# Patient Record
Sex: MALE | Race: WHITE | NOT HISPANIC OR LATINO | Employment: FULL TIME | URBAN - METROPOLITAN AREA
[De-identification: names, ages, dates, MRNs, and addresses within clinical notes are randomized per-mention and may not be internally consistent; named-entity substitution may affect disease eponyms.]

---

## 2023-02-22 ENCOUNTER — OFFICE VISIT (OUTPATIENT)
Dept: ORTHOPEDIC SURGERY | Facility: CLINIC | Age: 68
End: 2023-02-22
Payer: COMMERCIAL

## 2023-02-22 VITALS — TEMPERATURE: 97.3 F | WEIGHT: 169.6 LBS | HEIGHT: 68 IN | BODY MASS INDEX: 25.7 KG/M2

## 2023-02-22 DIAGNOSIS — M17.10 PATELLOFEMORAL ARTHRITIS: Primary | ICD-10-CM

## 2023-02-22 DIAGNOSIS — M72.2 PLANTAR FASCIITIS OF RIGHT FOOT: ICD-10-CM

## 2023-02-22 PROCEDURE — 73562 X-RAY EXAM OF KNEE 3: CPT | Performed by: ORTHOPAEDIC SURGERY

## 2023-02-22 PROCEDURE — 99204 OFFICE O/P NEW MOD 45 MIN: CPT | Performed by: ORTHOPAEDIC SURGERY

## 2023-02-22 RX ORDER — NAPROXEN SODIUM 220 MG
220 TABLET ORAL
COMMUNITY

## 2023-02-22 RX ORDER — PANTOPRAZOLE SODIUM 40 MG/1
TABLET, DELAYED RELEASE ORAL
COMMUNITY
Start: 2022-12-02

## 2023-02-22 NOTE — PROGRESS NOTES
Patient Name: Osvaldo Bertrand   YOB: 1955  Referring Primary Care Physician: Yvon Rosa MD  BMI: Body mass index is 25.79 kg/m².    Chief Complaint:    Chief Complaint   Patient presents with   • Right Knee - Follow-up        HPI:     Osvaldo Bertrand is a 67 y.o. male who presents today for evaluation of   Chief Complaint   Patient presents with   • Right Knee - Follow-up   .  Osvaldo is seen today complaining of some right knee pain.  He says been going on for many years.  He is to see Dr. Rasmussen and was told about 6 years ago he had a medial meniscus tear.  He says he got some injections he thinks a couple of MRIs and it did pretty well for him.  It bothers him off and on now he does take some naproxen on occasion and he wears a brace.  He says he is Issa Brantley is relative      Subjective   Medications:   Home Medications:  Current Outpatient Medications on File Prior to Visit   Medication Sig   • naproxen sodium (ALEVE) 220 MG tablet Take 220 mg by mouth.   • pantoprazole (PROTONIX) 40 MG EC tablet      No current facility-administered medications on file prior to visit.     Current Medications:  Scheduled Meds:  Continuous Infusions:No current facility-administered medications for this visit.    PRN Meds:.    I have reviewed the patient's medical history in detail and updated the computerized patient record.  Review and summarization of old records includes:    History reviewed. No pertinent past medical history.   No past surgical history on file.     Social History     Occupational History   • Not on file   Tobacco Use   • Smoking status: Not on file   • Smokeless tobacco: Not on file   Substance and Sexual Activity   • Alcohol use: Not on file   • Drug use: Not on file   • Sexual activity: Not on file      Social History     Social History Narrative   • Not on file      No family history on file.    ROS: 14 point review of systems was performed and all other systems were reviewed and are negative  "except for documented findings in HPI and today's encounter.     Allergies: No Known Allergies  Constitutional:  Denies fever, shaking or chills   Eyes:  Denies change in visual acuity   HENT:  Denies nasal congestion or sore throat   Respiratory:  Denies cough or shortness of breath   Cardiovascular:  Denies chest pain or severe LE edema   GI:  Denies abdominal pain, nausea, vomiting, bloody stools or diarrhea   Musculoskeletal:  Numbness, tingling, pain, or loss of motor function only as noted above in history of present illness.  : Denies painful urination or hematuria  Integument:  Denies rash, lesion or ulceration   Neurologic:  Denies headache or focal weakness  Endocrine:  Denies lymphadenopathy  Psych:  Denies confusion or change in mental status   Hem:  Denies active bleeding    OBJECTIVE:  Physical Exam: 67 y.o. male  Wt Readings from Last 3 Encounters:   02/22/23 76.9 kg (169 lb 9.6 oz)     Ht Readings from Last 1 Encounters:   02/22/23 172.7 cm (68\")     Body mass index is 25.79 kg/m².  Vitals:    02/22/23 1100   Temp: 97.3 °F (36.3 °C)     Vital signs reviewed.     General Appearance:    Alert, cooperative, in no acute distress                  Eyes: conjunctiva clear  ENT: external ears and nose atraumatic  CV: no peripheral edema  Resp: normal respiratory effort  Skin: no rashes or wounds; normal turgor  Psych: mood and affect appropriate  Lymph: no nodes appreciated  Neuro: gross sensation intact  Vascular:  Palpable peripheral pulse in noted extremity  Musculoskeletal Extremities: Exam today shows pleasant man right knee has moderate swelling and it Maris's is negative despite rich heated examples and he has good range of motion he has diffuse medial and posterior medial joint line tenderness and his ligaments feel stable.  He is also have some tenderness in his right heel cord and says he developed some planter fasciitis over the last several years but is not really had any treatment for it.  " This was an add-on complaint    Radiology:   PA lateral 40 degree PA x-ray right knee taken the office today for complaints of pain without comparison views available show evidence of some patellofemoral arthritis.  There is some Susy changes noted in the knee however he still has maintenance of good joint space in the femoral-tibial articulation both AP and lateral.        Assessment:     ICD-10-CM ICD-9-CM   1. Patellofemoral arthritis  M17.10 716.96   2. Plantar fasciitis of right foot  M72.2 728.71        MDM/Plan:   The diagnosis(es), natural history, pathophysiology and treatment for diagnosis(es) were discussed. Opportunity given and questions answered.  Biomechanics of pertinent body areas discussed.  When appropriate, the use of ambulatory aids discussed.    Biomechanics of pertinent body areas discussed.  When appropriate, the use of ambulatory aids discussed.  BMI:  The concept of BMI body mass index and its importance and implications discussed.    MEDICATIONS:  The risks, benefits, warnings,side effects and alternatives of medications discussed.  Inflammation/pain control; with cold, heat, elevation and/or liniments discussed as appropriate  CONSULT: This Consult is done at the request of a requesting provider to whom I will send this report with this rendered opinion.  MEDICAL RECORDS reviewed from other provider(s) for past and current medical history pertinent to this complaint.  Spoke to about arthritis in general and some the different treatments available went over different kinds of injections etc.  Offered a corticosteroid injection but does not bother him enough to that extent went over dosing of over-the-counter Aleve and thought he might try some this for a while to see if it helps he is really not having mechanical symptoms nor could I find signs with Maris's testing on today's exam and he may have had a small meniscal tear etc. in the past.  If he develops mechanical symptoms or  locking I told him there is more to do.  He will try the medicines reluctantly he agreed to try some physical therapy explained to him if he can get in a few visits it would help.  Also put in a request that they work on a planter fasciitis as well as his patellofemoral syndrome/arthritis of the knee and hopefully this will get him better if he does not I am happy to see him back on the knee and refer him on the heel    2/22/2023    Dictated utilizing Dragon dictation